# Patient Record
Sex: MALE | URBAN - METROPOLITAN AREA
[De-identification: names, ages, dates, MRNs, and addresses within clinical notes are randomized per-mention and may not be internally consistent; named-entity substitution may affect disease eponyms.]

---

## 2019-09-05 ENCOUNTER — HISTORICAL (OUTPATIENT)
Dept: SURGERY | Facility: HOSPITAL | Age: 39
End: 2019-09-05

## 2022-04-30 NOTE — OP NOTE
DATE OF SURGERY:    09/05/2019    SURGEON:  Prosper Welch MD    PREOPERATIVE DIAGNOSIS:  Fracture dislocation, right middle finger distal interphalangeal joint with extensor tendon rupture.    POSTOPERATIVE DIAGNOSIS:  Fracture dislocation, right middle finger distal interphalangeal joint with extensor tendon rupture.    PROCEDURE:    1. Open reduction, percutaneous fixation, fracture dislocation right distal interphalangeal joint.   2. Irrigation, debridement open fracture site right distal interphalangeal joint.   3. Relocation of a dislocated distal interphalangeal joint.   4. Repair of extensor tendon, right middle finger.    INDICATIONS FOR PROCEDURE:  Mr. Wells is a 39-year-old male who was working with a post and a piece of metal slipped, lacerated his right middle finger.  He now has a mallet finger deformity with ruptured extensor tendon and an open fracture dislocation of the right finger.  Presents for repair.    ANESTHESIA:  General.    COMPLICATIONS:  None.    PROCEDURE IN DETAIL:  The patient was endotracheally intubated, prepped and draped in the usual sterile fashion.  Esmarch was used to exsanguinate the right upper extremity.  The tourniquet was inflated to 250 mmHg.  I first began by releasing the previous sutures and using sterile irrigation to debride out the fracture at the DIP joint.  Was a small fracture fragment which was debrided using the sterile saline and pickups.  After this was completed, the joint was relocated from his previously volar dislocation.       After this was completed, we placed a single 0.0035 K-wire in a retrograde fashion and then under fluoroscopic guidance, performed open reduction and percutaneous fixation of the DIP joint, which was fractured and dislocated.  After this fixation was completed the extensor tendon was reapproximated using     interrupted 4-0 FiberWire.  We then closed the skin using a running 4-0 Monocryl and no complications.  I was  scrubbed and present for the entire procedure.        ______________________________  Prosper Welch MD    BF/UR  DD:  09/05/2019  Time:  10:03AM  DT:  09/05/2019  Time:  10:13AM  Job #:  [id number]